# Patient Record
Sex: FEMALE | Race: WHITE | NOT HISPANIC OR LATINO | Employment: STUDENT | URBAN - METROPOLITAN AREA
[De-identification: names, ages, dates, MRNs, and addresses within clinical notes are randomized per-mention and may not be internally consistent; named-entity substitution may affect disease eponyms.]

---

## 2019-04-25 ENCOUNTER — OFFICE VISIT (OUTPATIENT)
Dept: OBSTETRICS AND GYNECOLOGY | Facility: CLINIC | Age: 19
End: 2019-04-25

## 2019-04-25 VITALS
HEIGHT: 60 IN | SYSTOLIC BLOOD PRESSURE: 108 MMHG | DIASTOLIC BLOOD PRESSURE: 73 MMHG | BODY MASS INDEX: 31.8 KG/M2 | HEART RATE: 78 BPM | WEIGHT: 162 LBS

## 2019-04-25 DIAGNOSIS — A54.9 GONORRHEA: ICD-10-CM

## 2019-04-25 DIAGNOSIS — T83.9XXA COMPLICATION OF INTRAUTERINE DEVICE (IUD), UNSPECIFIED COMPLICATION, INITIAL ENCOUNTER (HCC): Primary | ICD-10-CM

## 2019-04-25 DIAGNOSIS — Z30.433 ENCOUNTER FOR IUD REMOVAL AND REINSERTION: ICD-10-CM

## 2019-04-25 DIAGNOSIS — R10.2 PELVIC PAIN: ICD-10-CM

## 2019-04-25 PROCEDURE — 99214 OFFICE O/P EST MOD 30 MIN: CPT | Performed by: OBSTETRICS & GYNECOLOGY

## 2019-04-25 PROCEDURE — 58301 REMOVE INTRAUTERINE DEVICE: CPT | Performed by: OBSTETRICS & GYNECOLOGY

## 2019-04-25 PROCEDURE — 58300 INSERT INTRAUTERINE DEVICE: CPT | Performed by: OBSTETRICS & GYNECOLOGY

## 2019-04-25 NOTE — PROGRESS NOTES
Cinthya Mendez is a 18 y.o. female.   Cc: pt here for pain with Mirena.   History of Present Illness   Patient had previously seen me for birth control counseling in 2016.  At that time, she had elected to proceed with Sahri IUD.  The patient has been pregnant one time but had a miscarriage.  Patient did not return for her IUD insertion.  Instead, she [place].  She ultimately had a Mirena IUD insertion in October 2018.  She reports that the insertion was very painful, and since then she has basically had daily pelvic pain.  After talking with her relatives, she finally came to learn that this is not normal for an IUD.  She reports that she has occasional spotting off and on.  She is sexually active.  She would like to have her Mirena IUD removed today.  She is still interested in contraception.  Patient also wants to have something to treat her periods.  She does report a history of very heavy and painful periods.  Patient had also previously tried birth control pills and Depo-Provera before 2016.  She says she felt like she gained weight with the birth control pills and lost weight with the Depo-Provera.    No current outpatient medications on file prior to visit.     No current facility-administered medications on file prior to visit.      No Known Allergies    The following portions of the patient's history were reviewed and updated as appropriate: allergies, current medications, past family history, past medical history, past social history, past surgical history and problem list.    Review of Systems  General: No fever or chills  Constitutional: No weight loss or gain, no hair loss  Abdomen: No nausea, vomiting, constipation or diarrhea  : No dysuria, no hematuria  Psych: Normal though content, no hallucinations, no SI/HI      Objective   Physical Exam  Vitals:    04/25/19 0845   BP: 108/73   Pulse: 78   Weight: 73.5 kg (162 lb)  "  Height: 152.4 cm (60\")     Gen: No acute distress, awake and oriented times three  Abdomen: soft, nontender, no masses or hernia, no hepatosplenomegaly, non distended, normoactive bowel sounds  Pelvic: Exam performed in the presence of a female chaperone  Patient has provided verbal consent to proceed with exam.  Normal external female genitalia, no lesions  Urethra: Normal meatus, no caruncle  Bladder: nontender  Vagina: No blood or discharge  Cervix: No cervical motion tenderness, no lesions, no active bleeding, nonfriable, IUD strings seen at cervix, but appear to be about 4-5 cm on  Uterus: retroverted/neutral, normal size and shape, nontender  Adnexa: No masses or tenderness  External anal exam: Normal appearance, no lesions or hemorrhoids  Rectal: Deferred  Psych: Good judgement and insight, normal affect and mood        Assessment/Plan   Diagnoses and all orders for this visit:    Complication of intrauterine device (IUD), unspecified complication, initial encounter (CMS/[facility])  Patient has been having pain with her Mirena IUD.  I do feel that sometimes the newer/smaller IUD such as Shari or Kyleena are better tolerated in nulliparous women.  Additionally, the strings seem longer today than I would expect.  It is possible that the IUD could be low in the uterus or even within the cervix.  After the patient the option of having an ultrasound to check for this versus just proceeding with IUD removal today.  She prefers to have the IUD removed today.  We have discussed alternative measures of contraception and ways of treating her painful and heavy periods.  The patient feels she would actually like to have another IUD.  She would be interested in having a Kyleena.  We discussed the risks, benefits, and alternatives to this.  We discussed the typical side effects related to the IUD.  We offered other options such as birth control pills, Depo-Provera, birth control patches, Nexplanon, Etc.  She would like to " proceed with removal of her current IUD and insertion of a new one.    I spent 20 out of 25 minutes with the patient in face to face counseling of the above issues.    Encounter for IUD removal and reinsertion  See procedure note  Pelvic pain  -     Chlamydia trachomatis, Neisseria gonorrhoeae, Trichomonas vaginalis, PCR - Swab, Vagina  Given her pain, and I would also recommend checking for gonorrhea and chlamydia today.She is instructed to call our office for the results if she has not heard them after 1 week.

## 2019-04-25 NOTE — PROGRESS NOTES
Procedure: Mirena Intrauterine device removal and insertion of Kyleena  Preoperative diagnosis:  1.  18-year-old  1 para 0 with pelvic pain associated with Mirena IUD  2.  Need for contraception, patient requests progesterone IUD  3.  Menorrhagia  4.  Dysmenorrhea  Postoperative diagnosis: Same  Indications: Please see office note from today for full details.  In summary, the patient has had pelvic pain since her Mirena insertion in 2018 at an outside facility.  Strings seem somewhat long today.  It is possible that the IUD may be positioned low in the uterus or even within the cervix.  Patient would like to have this IUD removed and a Kyleena inserted.   Anesthesia: None  Pathology: None  Estimated blood loss: Less than 5 mL  Complications: None    Procedure in detail:  Patient placed in lithotomy position in stirrups. [place] speculum placed into vagina. Cervix was visualized. IUD strings were seen at the external os. Strings were grasped with a ring forceps and the IUD was easily removed with slow, steady, gentle tension. IUD was removed intact and discarded. No complications. Patient tolerated the procedure well.   The risks, benefits, and alternatives to Kyleena were explained at length with the patient. All her questions were answered and consents were signed.  The patient was placed in a dorsal lithotomy position on the examining table in [facility]. A bimanual exam confirmed the uterus was normal in size, slightly retroverted, and midplane. The cervix was prepped with Betadine. The anterior lip was grasped with a single-tooth tenaculum. The endometrial cavity was then sounded to 7 cm without use of a dilator. Gloves were then exchanged for sterile gloves. This sealed Kyleena package was opened and the device was removed in a sterile fashion.  The upper edge of the depth setting the flange was set at a uterine sound measured. The  was then carefully advanced to the cervical canal into the  uterus to the level of the fundus. This was then backed off about 1.5-2 cm to allow sufficient space for the arms to open. The slider was then retracted about 1 cm and deployed the device. The device was then gently advanced to the fundus. The Kyleena was then released by pulling the slider down all the way. The  was removed carefully from the uterus. The threads were then cut leaving 2-3 cm visible outside of the cervix.  The single-tooth tenaculum was removed from the anterior lip. Silver nitrate was applied to the tenaculum sites. Good hemostasis was noted. All other instruments were removed from the vagina. There were no complications, and the patient tolerated the procedure well with a minimal amount of discomfort. The patient was counseled about the need to return in 4 weeks for US to check IUD location. She was counseled about the need to use a backup method of contraception such as condoms until her post insertion exam was performed. The patient verbalized understanding that the Kyleena will need to be removed/replaced after 5 years. The patient is counseled to contact us if she has any significant or increasing bleeding, pain, fever, chills, or other concerns. She is instructed to see a doctor right away if she believes that she may be pregnant at any time with the Kyleena in place.

## 2019-04-29 LAB
C TRACH RRNA SPEC QL NAA+PROBE: NEGATIVE
N GONORRHOEA RRNA SPEC QL NAA+PROBE: POSITIVE
T VAGINALIS RRNA VAG QL NAA+PROBE: NEGATIVE

## 2019-04-30 ENCOUNTER — TELEPHONE (OUTPATIENT)
Dept: OBSTETRICS AND GYNECOLOGY | Facility: CLINIC | Age: 19
End: 2019-04-30

## 2019-04-30 PROBLEM — A54.9 GONORRHEA: Status: ACTIVE | Noted: 2019-04-30

## 2019-04-30 RX ORDER — AZITHROMYCIN 500 MG/1
TABLET, FILM COATED ORAL
Qty: 2 TABLET | Refills: 0 | Status: SHIPPED | OUTPATIENT
Start: 2019-04-30 | End: 2020-01-09

## 2019-04-30 NOTE — TELEPHONE ENCOUNTER
Pt aware. Pt will call back to make appt for injection. QAMAR        ----- Message from Pedro Sherman MD sent at 4/30/2019 10:23 AM EDT -----  Notify the patient that her gonorrhea test was positive.  I sent in a prescription for azithromycin.  She will also need to come into the office for a Rocephin injection.  She will need to notify her partner and he will need to be tested and/or treated as well.  They must abstain from sexual intercourse until 7 days after both have completed treatment.  The patient had a Kyleena inserted at the last visit.  This infection does not mean that we need to remove the Kyleena, but she definitely needs to be treated, and she should make an appointment with me in about 4 weeks to be sure that the infection has gone away.

## 2019-05-02 ENCOUNTER — CLINICAL SUPPORT (OUTPATIENT)
Dept: OBSTETRICS AND GYNECOLOGY | Facility: CLINIC | Age: 19
End: 2019-05-02

## 2019-05-02 VITALS
WEIGHT: 166 LBS | HEIGHT: 60 IN | HEART RATE: 97 BPM | DIASTOLIC BLOOD PRESSURE: 71 MMHG | SYSTOLIC BLOOD PRESSURE: 116 MMHG | BODY MASS INDEX: 32.59 KG/M2

## 2019-05-02 DIAGNOSIS — A54.9 GONORRHEA: Primary | ICD-10-CM

## 2019-05-02 PROCEDURE — 96372 THER/PROPH/DIAG INJ SC/IM: CPT | Performed by: OBSTETRICS & GYNECOLOGY

## 2019-05-02 RX ORDER — CEFTRIAXONE SODIUM 250 MG/1
250 INJECTION, POWDER, FOR SOLUTION INTRAMUSCULAR; INTRAVENOUS EVERY 24 HOURS
Status: COMPLETED | OUTPATIENT
Start: 2019-05-02 | End: 2019-05-02

## 2019-05-02 RX ADMIN — CEFTRIAXONE SODIUM 250 MG: 250 INJECTION, POWDER, FOR SOLUTION INTRAMUSCULAR; INTRAVENOUS at 08:59

## 2020-01-09 ENCOUNTER — PROCEDURE VISIT (OUTPATIENT)
Dept: OBSTETRICS AND GYNECOLOGY | Facility: CLINIC | Age: 20
End: 2020-01-09

## 2020-01-09 ENCOUNTER — OFFICE VISIT (OUTPATIENT)
Dept: OBSTETRICS AND GYNECOLOGY | Facility: CLINIC | Age: 20
End: 2020-01-09

## 2020-01-09 VITALS
WEIGHT: 176 LBS | DIASTOLIC BLOOD PRESSURE: 71 MMHG | HEART RATE: 76 BPM | HEIGHT: 60 IN | BODY MASS INDEX: 34.55 KG/M2 | SYSTOLIC BLOOD PRESSURE: 105 MMHG

## 2020-01-09 DIAGNOSIS — Z97.5 IUD (INTRAUTERINE DEVICE) IN PLACE: ICD-10-CM

## 2020-01-09 DIAGNOSIS — R10.2 PELVIC PAIN: Primary | ICD-10-CM

## 2020-01-09 PROCEDURE — 76830 TRANSVAGINAL US NON-OB: CPT | Performed by: OBSTETRICS & GYNECOLOGY

## 2020-01-09 PROCEDURE — 99213 OFFICE O/P EST LOW 20 MIN: CPT | Performed by: OBSTETRICS & GYNECOLOGY

## 2020-01-09 RX ORDER — NAPROXEN 500 MG/1
250 TABLET ORAL EVERY 8 HOURS PRN
Qty: 30 TABLET | Refills: 0 | Status: SHIPPED | OUTPATIENT
Start: 2020-01-09 | End: 2020-01-29

## 2020-01-09 RX ORDER — ACETAMINOPHEN 325 MG/1
650 TABLET ORAL EVERY 6 HOURS PRN
Qty: 100 TABLET | Refills: 2 | Status: SHIPPED | OUTPATIENT
Start: 2020-01-09 | End: 2020-01-29

## 2020-01-09 NOTE — PROGRESS NOTES
"SUBJECTIVE:   Chief Complaint   Patient presents with   • Contraception     pt reports sharp pain due to the Kyleena inserted on 19. She reports she has a tilted uterus per Dr. Sherman she states.         Екатерина Mendez is a 19 y.o.  who presents for pain following intercourse one week ago with new partner.  Reports pain is every day but \"comes in waves.\"  First starts when she wakes up and moves around.  So severe she has difficulty getting out of bed.  Initially taking Naproxen which was helping, but stopped because she was having to take ever 4 hours.  Has taken Ibuprofen without improvement.  No improvement with heating pad or warm shower.  No bleeding, no vaginal discharge.  No fever or chills.  No vomiting.      Kyleena was inserted in 2019.  Since then until 1 week ago she would occasionally have a light period but not as much recently. Had infrequent cramps.  Prior to the Kyleena, she had a Mirena inserted in  and had similar pain with that device.      History reviewed. No pertinent past medical history.  History reviewed. No pertinent surgical history.  OB History    Para Term  AB Living   1 0     1 0   SAB TAB Ectopic Molar Multiple Live Births   1                # Outcome Date GA Lbr Delonte/2nd Weight Sex Delivery Anes PTL Lv   1 SAB               Social History     Tobacco Use   • Smoking status: Never Smoker   • Smokeless tobacco: Never Used   Substance Use Topics   • Alcohol use: No   • Drug use: No     Family History   Problem Relation Age of Onset   • Breast cancer Mother    • Bipolar disorder Father    • Cancer Paternal Grandmother    • Diabetes Paternal Grandfather      Current Outpatient Medications on File Prior to Visit   Medication Sig Dispense Refill   • [DISCONTINUED] azithromycin (ZITHROMAX) 500 MG tablet Take 2 pills once 2 tablet 0     No current facility-administered medications on file prior to visit.      No Known Allergies     Review of Systems " "  Constitutional: Negative.    HENT: Negative.    Respiratory: Negative.    Cardiovascular: Negative.    Gastrointestinal: Negative.    Endocrine: Negative.    Genitourinary: Negative.    Musculoskeletal: Negative.    Skin: Negative.    Neurological: Negative.    Psychiatric/Behavioral: Negative.          OBJECTIVE:   Vitals:    01/09/20 0907   BP: 105/71   Pulse: 76   Weight: 79.8 kg (176 lb)   Height: 152.4 cm (60\")      Physical Exam   Constitutional: She is oriented to person, place, and time. She appears well-developed and well-nourished. No distress.   HENT:   Head: Normocephalic and atraumatic.   Eyes: EOM are normal. No scleral icterus.   Neck: Normal range of motion.   Cardiovascular: Normal rate and regular rhythm.   Pulmonary/Chest: Effort normal. No respiratory distress.   Abdominal: Soft. She exhibits no distension.   Genitourinary: Uterus normal. There is no rash, tenderness, lesion, injury or Bartholin's cyst on the right labia. There is no rash, tenderness, lesion, injury or Bartholin's cyst on the left labia.   Cervix is nulliparous. Cervix exhibits visible IUD strings. Cervix does not exhibit motion tenderness, discharge, friability, lesion, polyp or eversion. Right adnexum displays no mass, no tenderness and no fullness. Right adnexum is present.Left adnexum displays no mass, no tenderness and no fullness. Left adnexum is present.Vagina exhibits no lesion and no loss of rugae. No erythema, tenderness or bleeding in the vagina. No foreign body in the vagina. No signs of injury around the vagina. No vaginal discharge found.   Genitourinary Comments: + levator ani tenderness on bimanual exam   Musculoskeletal: Normal range of motion.   Neurological: She is alert and oriented to person, place, and time.   Skin: Skin is warm and dry. No rash noted. She is not diaphoretic. No erythema.   Psychiatric: She has a normal mood and affect. Her behavior is normal. Judgment and thought content normal. "       ASSESSMENT/PLAN:     ICD-10-CM ICD-9-CM   1. Pelvic pain R10.2 [id number]       Reviewed with patient etiologies including but not limited to pelvic pathology (ovarian cyst), endometriosis, levator ani spasm, malpositioned IUD. IUD appears appropriate on exam. Will get US.  She is having levator ani tenderness. Give that it has only been one week would recommend 5 days of NSAID, tylenol and pelvic rest.  Follow up and consider PT if no improvement.     Return in about 2 weeks (around 1/23/2020) for Recheck.

## 2020-01-11 LAB
C TRACH RRNA SPEC QL NAA+PROBE: NEGATIVE
N GONORRHOEA RRNA SPEC QL NAA+PROBE: NEGATIVE
T VAGINALIS DNA SPEC QL NAA+PROBE: NEGATIVE

## 2020-01-15 ENCOUNTER — TELEPHONE (OUTPATIENT)
Dept: OBSTETRICS AND GYNECOLOGY | Facility: CLINIC | Age: 20
End: 2020-01-15

## 2020-01-15 NOTE — TELEPHONE ENCOUNTER
----- Message from Jessica Wilson MD sent at 1/13/2020  9:33 AM EST -----  Please inform patient of negative gonorrhea/chlamydia/trichomonas testing    01/15/20  Called pt, pt was not available at the moment. Left a msg to return call.    Post op gtt instructions reviewed with patient.

## 2020-01-29 ENCOUNTER — OFFICE VISIT (OUTPATIENT)
Dept: OBSTETRICS AND GYNECOLOGY | Facility: CLINIC | Age: 20
End: 2020-01-29

## 2020-01-29 VITALS
HEART RATE: 88 BPM | BODY MASS INDEX: 35.93 KG/M2 | HEIGHT: 60 IN | DIASTOLIC BLOOD PRESSURE: 63 MMHG | SYSTOLIC BLOOD PRESSURE: 98 MMHG | WEIGHT: 183 LBS

## 2020-01-29 DIAGNOSIS — R10.2 PELVIC PAIN: Primary | ICD-10-CM

## 2020-01-29 PROCEDURE — 99212 OFFICE O/P EST SF 10 MIN: CPT | Performed by: OBSTETRICS & GYNECOLOGY

## 2020-03-11 ENCOUNTER — OFFICE VISIT (OUTPATIENT)
Dept: OBSTETRICS AND GYNECOLOGY | Facility: CLINIC | Age: 20
End: 2020-03-11

## 2020-03-11 VITALS
WEIGHT: 183 LBS | SYSTOLIC BLOOD PRESSURE: 108 MMHG | BODY MASS INDEX: 35.93 KG/M2 | DIASTOLIC BLOOD PRESSURE: 72 MMHG | HEIGHT: 60 IN | HEART RATE: 91 BPM

## 2020-03-11 DIAGNOSIS — Z97.5 CONTRACEPTION, DEVICE INTRAUTERINE: ICD-10-CM

## 2020-03-11 PROCEDURE — 58301 REMOVE INTRAUTERINE DEVICE: CPT | Performed by: OBSTETRICS & GYNECOLOGY

## 2020-03-11 RX ORDER — PNV NO.95/FERROUS FUM/FOLIC AC 28MG-0.8MG
1 TABLET ORAL DAILY
Qty: 90 TABLET | Refills: 4 | Status: SHIPPED | OUTPATIENT
Start: 2020-03-11

## 2020-03-11 NOTE — PROGRESS NOTES
Kyleena IUD Removal Procedure Note    Indications: Desires conception  Pre Procedural Diagnosis: Desires removal of IUD due to desiring conception    Post Procedural Diagnosis: Same  Counseling:  Patient was counseled on risks of IUD removal including but not limited to abnormal bleeding, infection, pregnancy, need for additional procedures and/or need for surgery.  All questions were answered to apparent satisfaction.     Procedure Details     Bimanual exam revealed Anteverted.  A speculum was inserted. The IUD strings were seen, grasped with a ring forcep and removed without difficulty.  The Kyleena IUD was inspected and noted to be removed in its entirety.  Patient tolerated procedure well.    IUD Information:  See medication record.    Condition:  Stable    Complications:  None    Plan:    The patient was advised to call for any fever or for prolonged or severe pain or bleeding; she was also advised to use start PNV. Anticipated return to regular menstrual cycles was reviewed.  Indications for follow up were reviewed. She was advised to use OTC analgesics as needed for mild to moderate pain.  Return to the clinic PRN for follow-up.

## 2023-01-30 ENCOUNTER — APPOINTMENT (OUTPATIENT)
Dept: CT IMAGING | Facility: HOSPITAL | Age: 23
End: 2023-01-30
Payer: MEDICAID

## 2023-01-30 ENCOUNTER — HOSPITAL ENCOUNTER (EMERGENCY)
Facility: HOSPITAL | Age: 23
Discharge: HOME | End: 2023-01-30
Attending: EMERGENCY MEDICINE | Admitting: EMERGENCY MEDICINE
Payer: MEDICAID

## 2023-01-30 ENCOUNTER — APPOINTMENT (OUTPATIENT)
Dept: GENERAL RADIOLOGY | Facility: HOSPITAL | Age: 23
End: 2023-01-30
Payer: MEDICAID

## 2023-01-30 VITALS
HEIGHT: 60 IN | WEIGHT: 238.1 LBS | DIASTOLIC BLOOD PRESSURE: 68 MMHG | HEART RATE: 92 BPM | RESPIRATION RATE: 20 BRPM | BODY MASS INDEX: 46.75 KG/M2 | OXYGEN SATURATION: 98 % | TEMPERATURE: 98.2 F | SYSTOLIC BLOOD PRESSURE: 112 MMHG

## 2023-01-30 DIAGNOSIS — M54.9 ACUTE MIDLINE BACK PAIN, UNSPECIFIED BACK LOCATION: ICD-10-CM
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
## 2023-01-30 DIAGNOSIS — S16.1XXA STRAIN OF NECK MUSCLE, INITIAL ENCOUNTER: ICD-10-CM

## 2023-01-30 LAB — HCG INTACT+B SERPL-ACNC: <0.5 MIU/ML

## 2023-01-30 PROCEDURE — 84702 CHORIONIC GONADOTROPIN TEST: CPT | Performed by: NURSE PRACTITIONER

## 2023-01-30 PROCEDURE — 96374 THER/PROPH/DIAG INJ IV PUSH: CPT

## 2023-01-30 PROCEDURE — 70450 CT HEAD/BRAIN W/O DYE: CPT

## 2023-01-30 PROCEDURE — 99283 EMERGENCY DEPT VISIT LOW MDM: CPT

## 2023-01-30 PROCEDURE — 72125 CT NECK SPINE W/O DYE: CPT

## 2023-01-30 PROCEDURE — 36415 COLL VENOUS BLD VENIPUNCTURE: CPT

## 2023-01-30 PROCEDURE — 25010000002 ORPHENADRINE CITRATE PER 60 MG: Performed by: NURSE PRACTITIONER

## 2023-01-30 PROCEDURE — 96375 TX/PRO/DX INJ NEW DRUG ADDON: CPT

## 2023-01-30 PROCEDURE — 72100 X-RAY EXAM L-S SPINE 2/3 VWS: CPT

## 2023-01-30 PROCEDURE — 25010000002 KETOROLAC TROMETHAMINE PER 15 MG: Performed by: NURSE PRACTITIONER

## 2023-01-30 PROCEDURE — 72072 X-RAY EXAM THORAC SPINE 3VWS: CPT

## 2023-01-30 RX ORDER — ORPHENADRINE CITRATE 30 MG/ML
60 INJECTION INTRAMUSCULAR; INTRAVENOUS ONCE
Status: COMPLETED | OUTPATIENT
Start: 2023-01-30 | End: 2023-01-30

## 2023-01-30 RX ORDER — KETOROLAC TROMETHAMINE 30 MG/ML
30 INJECTION, SOLUTION INTRAMUSCULAR; INTRAVENOUS ONCE
Status: COMPLETED | OUTPATIENT
Start: 2023-01-30 | End: 2023-01-30

## 2023-01-30 RX ADMIN — KETOROLAC TROMETHAMINE 30 MG: 30 INJECTION, SOLUTION INTRAMUSCULAR; INTRAVENOUS at 05:41

## 2023-01-30 RX ADMIN — ORPHENADRINE CITRATE 60 MG: 30 INJECTION INTRAMUSCULAR; INTRAVENOUS at 05:40
